# Patient Record
Sex: MALE | Race: WHITE | NOT HISPANIC OR LATINO | Employment: FULL TIME | URBAN - METROPOLITAN AREA
[De-identification: names, ages, dates, MRNs, and addresses within clinical notes are randomized per-mention and may not be internally consistent; named-entity substitution may affect disease eponyms.]

---

## 2019-07-09 ENCOUNTER — OFFICE VISIT - HEALTHEAST (OUTPATIENT)
Dept: FAMILY MEDICINE | Facility: CLINIC | Age: 49
End: 2019-07-09

## 2019-07-09 ENCOUNTER — HOSPITAL ENCOUNTER (OUTPATIENT)
Dept: CT IMAGING | Facility: HOSPITAL | Age: 49
Discharge: HOME | End: 2019-07-09
Attending: FAMILY MEDICINE

## 2019-07-09 ENCOUNTER — RECORDS - HEALTHEAST (OUTPATIENT)
Dept: ADMINISTRATIVE | Facility: OTHER | Age: 49
End: 2019-07-09

## 2019-07-09 DIAGNOSIS — S06.0X0A CONCUSSION WITHOUT LOSS OF CONSCIOUSNESS, INITIAL ENCOUNTER: ICD-10-CM

## 2019-07-09 DIAGNOSIS — S09.90XA CLOSED HEAD INJURY, INITIAL ENCOUNTER: ICD-10-CM

## 2019-07-09 ASSESSMENT — MIFFLIN-ST. JEOR: SCORE: 1802.02

## 2019-07-09 NOTE — ASSESSMENT & PLAN NOTE
Given significant symptomology as well as higher risk due to previous head injuries, CT scan was done.  CT scan is normal.  I discussed the results with patient via phone.  I suspect concussion and again he is significantly symptomatic.  The patient was taken not out of work for the next week as he works in a high pressure job as an .  Continue with over-the-counter medications as needed.  He was encouraged to rest a lot but light physical activity is also encouraged.  We reviewed activities he should avoid.  Follow-up promptly for any worsening of neurologic symptoms.  He is otherwise scheduled for follow-up with 1 of my partners in 1 week as I will be out of the office.  Patient is at high risk of persistent symptoms given his history of head injury as well as significant symptoms.  For this reason a referral was placed to the concussion clinic.

## 2021-05-26 ENCOUNTER — RECORDS - HEALTHEAST (OUTPATIENT)
Dept: ADMINISTRATIVE | Facility: CLINIC | Age: 51
End: 2021-05-26

## 2021-05-29 ENCOUNTER — RECORDS - HEALTHEAST (OUTPATIENT)
Dept: ADMINISTRATIVE | Facility: CLINIC | Age: 51
End: 2021-05-29

## 2021-05-30 ENCOUNTER — RECORDS - HEALTHEAST (OUTPATIENT)
Dept: ADMINISTRATIVE | Facility: CLINIC | Age: 51
End: 2021-05-30

## 2021-05-31 ENCOUNTER — RECORDS - HEALTHEAST (OUTPATIENT)
Dept: ADMINISTRATIVE | Facility: CLINIC | Age: 51
End: 2021-05-31

## 2021-06-03 VITALS — WEIGHT: 195.8 LBS | HEIGHT: 73 IN | BODY MASS INDEX: 25.95 KG/M2

## 2021-06-16 PROBLEM — S06.0X0A CONCUSSION WITHOUT LOSS OF CONSCIOUSNESS, INITIAL ENCOUNTER: Status: ACTIVE | Noted: 2019-07-09

## 2021-06-17 NOTE — PATIENT INSTRUCTIONS - HE
Patient Instructions by Toshia Ontiveros MD at 7/9/2019  1:00 PM     Author: Toshia Ontiveros MD Service: -- Author Type: Physician    Filed: 7/9/2019  1:39 PM Encounter Date: 7/9/2019 Status: Addendum    : Toshia Ontiveros MD (Physician)    Related Notes: Original Note by Toshia Ontiveros MD (Physician) filed at 7/9/2019  1:38 PM       Proceed to hospital for CT scan today as scheduled.  I will speak to you on the phone after your scan.  You will receive a call to schedule with the concussion clinic.  No work for one week. Light physical activity such as walking is recommended. Avoid vigorous physical activity. Avoid TV, video games, reading for the next week. Rest as much as possible.  Follow up as scheduled in [place] in one week.    Patient Education     Discharge Instructions for Concussion  You have been diagnosed with a concussion, a type of brain injury caused by a sudden impact to your head. It can also be caused by sudden movement of your brain inside your head, such as from forceful shaking. Some concussions are mild. Most people recover completely from mild concussions. But recovery may take days, weeks, or months. For some, symptoms may last even longer. Early care and monitoring are important to prevent long-term complications.  Home care  Do's and don'ts:     Ask a friend or family member to stay with you for a few days. You should not be alone until you know how the injury has affected you.    Tell your caregiver to wake you every 2 to 3 hours during the first night. Your caregiver should call 911 if he or she cant wake you, or if you are confused.    Dont take any medicine--not even aspirin--unless your healthcare provider says it's OK. If you have a headache, try placing a cold, damp cloth on your forehead.    Eat light. Clear liquids, such as broth or gelatin, are a good choice.    Don't drink alcohol or use any recreational drugs.     Don't return to sports or any activity that  could cause you to hit your head until all symptoms are gone and you have been cleared by your doctor. A second head injury before full recovery from the first one can lead to serious brain injury.    Avoid activities that require a lot of concentration or attention. This will allow your brain to rest and heal more quickly.  The best way to recover is to discuss symptoms with your healthcare provider and your family. Work closely with your healthcare provider and give your brain time to heal.  Follow-up care  Follow up with your healthcare provider, or as advised.     When to call your healthcare provider  Your caregiver should call 911 right away if you have fallen asleep, cannot be awakened, or you are confused.  Otherwise, call your healthcare provider right away if any of these occur:    Vomiting    Clear or bloody drainage from your nose or ear    Constant drowsiness or trouble waking up    Confusion or memory loss    Blurred vision    Trouble walking, talking, or concentrating    Increased weakness or problems with coordination    Constant headache that cant be relieved or gets worse    Changes in behavior or personality   Date Last Reviewed: 11/5/2015 2000-2017 [employer]. [address] [place] [address], [place] [address]. All rights reserved. This information is not intended as a substitute for professional medical care. Always follow your healthcare professional's instructions.

## 2021-06-19 NOTE — LETTER
Letter by Toshia Ontiveros MD at      Author: Toshia Ontiveros MD Service: -- Author Type: --    Filed:  Encounter Date: 7/9/2019 Status: (Other)         July 9, 2019     Patient: Carlton Rushing   YOB: 1970   Date of Visit: 7/9/2019       To whom it may concern,    Carlton Rushing was treated at our facility today.  Please excuse from work 7/8/2019 through 7/16/2019 for medical reasons.      Physician Name (Printed) - Toshia Ontiveros MD      _________________________________________  Physician Signature
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
## 2023-02-08 ENCOUNTER — APPOINTMENT (OUTPATIENT)
Dept: RADIOLOGY | Facility: HOSPITAL | Age: 53
End: 2023-02-08
Attending: EMERGENCY MEDICINE
Payer: COMMERCIAL

## 2023-02-08 ENCOUNTER — HOSPITAL ENCOUNTER (EMERGENCY)
Facility: HOSPITAL | Age: 53
Discharge: HOME | End: 2023-02-08
Attending: EMERGENCY MEDICINE | Admitting: EMERGENCY MEDICINE
Payer: COMMERCIAL

## 2023-02-08 VITALS
WEIGHT: 200 LBS | BODY MASS INDEX: 26.39 KG/M2 | OXYGEN SATURATION: 98 % | RESPIRATION RATE: 16 BRPM | DIASTOLIC BLOOD PRESSURE: 84 MMHG | HEART RATE: 74 BPM | TEMPERATURE: 98.1 F | SYSTOLIC BLOOD PRESSURE: 134 MMHG

## 2023-02-08 DIAGNOSIS — R07.9 CHEST PAIN, UNSPECIFIED TYPE: ICD-10-CM

## 2023-02-08 LAB
ANION GAP SERPL CALCULATED.3IONS-SCNC: 10 MMOL/L (ref 7–15)
BASOPHILS # BLD AUTO: 0.1 10E3/UL (ref 0–0.2)
BASOPHILS NFR BLD AUTO: 1 %
BUN SERPL-MCNC: 11.9 MG/DL (ref 6–20)
CALCIUM SERPL-MCNC: 9.3 MG/DL (ref 8.6–10)
CHLORIDE SERPL-SCNC: 104 MMOL/L (ref 98–107)
CREAT SERPL-MCNC: 1 MG/DL (ref 0.67–1.17)
D DIMER PPP FEU-MCNC: <0.27 UG/ML FEU (ref 0–0.5)
DEPRECATED HCO3 PLAS-SCNC: 25 MMOL/L (ref 22–29)
EOSINOPHIL # BLD AUTO: 0.3 10E3/UL (ref 0–0.7)
EOSINOPHIL NFR BLD AUTO: 5 %
ERYTHROCYTE [DISTWIDTH] IN BLOOD BY AUTOMATED COUNT: 12.6 % (ref 10–15)
GFR SERPL CREATININE-BSD FRML MDRD: >90 ML/MIN/1.73M2
GLUCOSE SERPL-MCNC: 106 MG/DL (ref 70–99)
HCT VFR BLD AUTO: 43.9 % (ref 40–53)
HGB BLD-MCNC: 14.7 G/DL (ref 13.3–17.7)
IMM GRANULOCYTES # BLD: 0 10E3/UL
IMM GRANULOCYTES NFR BLD: 0 %
LYMPHOCYTES # BLD AUTO: 1.9 10E3/UL (ref 0.8–5.3)
LYMPHOCYTES NFR BLD AUTO: 34 %
MCH RBC QN AUTO: 29.8 PG (ref 26.5–33)
MCHC RBC AUTO-ENTMCNC: 33.5 G/DL (ref 31.5–36.5)
MCV RBC AUTO: 89 FL (ref 78–100)
MONOCYTES # BLD AUTO: 0.4 10E3/UL (ref 0–1.3)
MONOCYTES NFR BLD AUTO: 8 %
NEUTROPHILS # BLD AUTO: 2.9 10E3/UL (ref 1.6–8.3)
NEUTROPHILS NFR BLD AUTO: 52 %
NRBC # BLD AUTO: 0 10E3/UL
NRBC BLD AUTO-RTO: 0 /100
PLATELET # BLD AUTO: 278 10E3/UL (ref 150–450)
POTASSIUM SERPL-SCNC: 4.7 MMOL/L (ref 3.4–5.3)
RBC # BLD AUTO: 4.94 10E6/UL (ref 4.4–5.9)
SODIUM SERPL-SCNC: 139 MMOL/L (ref 136–145)
TROPONIN T SERPL HS-MCNC: 11 NG/L
TROPONIN T SERPL HS-MCNC: 9 NG/L
WBC # BLD AUTO: 5.5 10E3/UL (ref 4–11)

## 2023-02-08 PROCEDURE — 99285 EMERGENCY DEPT VISIT HI MDM: CPT | Mod: 25

## 2023-02-08 PROCEDURE — 82947 ASSAY GLUCOSE BLOOD QUANT: CPT | Performed by: EMERGENCY MEDICINE

## 2023-02-08 PROCEDURE — 85379 FIBRIN DEGRADATION QUANT: CPT | Performed by: EMERGENCY MEDICINE

## 2023-02-08 PROCEDURE — 84484 ASSAY OF TROPONIN QUANT: CPT | Mod: 91 | Performed by: EMERGENCY MEDICINE

## 2023-02-08 PROCEDURE — 84484 ASSAY OF TROPONIN QUANT: CPT | Performed by: EMERGENCY MEDICINE

## 2023-02-08 PROCEDURE — 80051 ELECTROLYTE PANEL: CPT | Performed by: EMERGENCY MEDICINE

## 2023-02-08 PROCEDURE — 250N000013 HC RX MED GY IP 250 OP 250 PS 637: Performed by: EMERGENCY MEDICINE

## 2023-02-08 PROCEDURE — 71046 X-RAY EXAM CHEST 2 VIEWS: CPT

## 2023-02-08 PROCEDURE — 93005 ELECTROCARDIOGRAM TRACING: CPT | Performed by: EMERGENCY MEDICINE

## 2023-02-08 PROCEDURE — 36415 COLL VENOUS BLD VENIPUNCTURE: CPT | Performed by: EMERGENCY MEDICINE

## 2023-02-08 PROCEDURE — 85014 HEMATOCRIT: CPT | Performed by: EMERGENCY MEDICINE

## 2023-02-08 RX ORDER — ASPIRIN 81 MG/1
324 TABLET, CHEWABLE ORAL ONCE
Status: COMPLETED | OUTPATIENT
Start: 2023-02-08 | End: 2023-02-08

## 2023-02-08 RX ADMIN — ASPIRIN 324 MG: 81 TABLET, CHEWABLE ORAL at 09:52

## 2023-02-08 NOTE — ED TRIAGE NOTES
Patient presents here with mid sternal chest pain that began about 40 minutes prior to presentation here. He also notes bilateral jaw pain accompanying his symptoms. Pain is pressure like in nature. He rated the pain upon onset a 7/10. At this time, it is 5/10.

## 2023-02-08 NOTE — DISCHARGE INSTRUCTIONS
You were seen in the emergency department for chest pain.  Your evaluation included EKG and blood tests which were negative for any sign of ongoing heart injury.  Your chest x-ray also looked clear.  You had additional lab tests which did not show any signs of blood clotting.  We think your symptoms still sound concerning and we need you to follow-up as soon as possible with a specialty provider at the rapid access cardiology clinic.  We did place a referral, if you have not heard anything within the next 24 hours please call them to confirm an appointment.  We would recommend avoiding strenuous activities and following up there soon as possible to continue outpatient evaluation.  We would also recommend continuing to use Tylenol 650 mg every 6 hours for pain.  Avoid spicy foods fatty foods and alcohol as much as possible in the event that this is related to esophageal or stomach inflammation.  Give any other immediate concerns like severe breathing difficulties at rest, severe evolution of your chest pain, etc. we can reevaluate you in the emergency department anytime

## 2023-02-08 NOTE — ED PROVIDER NOTES
EMERGENCY DEPARTMENT ENCOUNTER      NAME: Carlton Rushing  AGE: 52 year old male  YOB: 1970  MRN: [id number]  EVALUATION DATE & TIME: No admission date for patient encounter.    PCP: No primary care provider on file.    ED PROVIDER: Chris Acosta M.D.      Chief Complaint   Patient presents with     Chest Pain         FINAL IMPRESSION:  1. Chest pain, unspecified type          ED COURSE & MEDICAL DECISION MAKIN:35 AM Resident met with the patient and gathered an initial history.   1:00 PM Rechecked and updated patient. I discussed plans for discharge with the patient, which they were agreeable to. We discussed supportive cares at home and reasons for return to the ER including new or worsening symptoms. All questions and concerns were addressed. Patient to be discharged by RN.     52 year old male presents to the Emergency Department for evaluation of chest pain.  Patient is vitally stable and well-appearing when he arrives to the emergency department.  Cardiopulmonary exam is unremarkable.  EKG reviewed shows sinus rhythm with no acute ischemic changes.  Troponin is within normal limits for male patient, no significant rise on recheck here and remains within normal limits.  D-dimer is within normal limits ruling out pulmonary embolism which seems unlikely based on history.  Aortic dissection felt unlikely based on history and clinical examination.  Chest x-ray negative for infiltrate, pneumothorax, or mediastinal abnormality.  Patient was resting comfortably on recheck with overall some improvement in his symptoms.  We discussed his reassuring work-up so far.  Given his somewhat concerning history, we did discuss possibility of admitting him to the hospital even in the absence of any other cardiovascular risk factors.  Patient was comfortable however with the plan for discharge home and was referred emergently to the rapid access clinic for follow-up and additional outpatient work-up.  We  did discuss return precautions for any severe evolution of his pain, severe breathing difficulties, lightheadedness, diaphoresis, or other immediate concern.  Patient was discharged in stable condition    At the conclusion of the encounter I discussed the results of all of the tests and the disposition. The questions were answered. The patient or family acknowledged understanding and was agreeable with the care plan.       Medical Decision Making    History:    Supplemental history from: Documented in chart, if applicable    External Record(s) reviewed: Documented in chart, if applicable.    Work Up:    Chart documentation includes differential considered and any EKGs or imaging independently interpreted by provider, where specified.    In additional to work up documented, I considered the following work up: Documented in chart, if applicable.    External consultation:    Discussion of management with another provider: Documented in chart, if applicable    Complicating factors:    Care impacted by chronic illness: N/A    Care affected by social determinants of health: N/A    Disposition considerations: Discharge. No recommendations on prescription strength medication(s). I considered admission, but discharged the patient after share decision making conversation.            MEDICATIONS GIVEN IN THE EMERGENCY:  Medications   aspirin (ASA) chewable tablet 324 mg (324 mg Oral Given 2/8/23 0952)       NEW PRESCRIPTIONS STARTED AT TODAY'S ER VISIT  Discharge Medication List as of 2/8/2023  1:28 PM             =================================================================    HPI    Patient information was obtained from: Patient     Use of : N/A         Carlton Rushing is a 52 year old male with no contributory medical history who presents to this ED via walk in for evaluation of chest pain.     The patient started having left sided chest pain that radiates to his neck, jaw and arm 45 minutes ago. He was  sitting down at his desk at rest when the pain came on suddenly. The pain was a 7/10 and is not worse with exertion. He reported having 2 cups of black coffee this morning prior to the pain. He reports having an episode like this before, but says the pain went away and he was doing nothing at that time as well. He still endorses pain in the ED.     Patient denies any cough, fever, or leg swelling. He's never met with cardiology before. No personal or family history of heart problems. Patient does not smoke or use drugs, but has occasional drinks. Patient does chew tobacco.     REVIEW OF SYSTEMS   All systems reviewed and negative except as noted in HPI.    PAST MEDICAL HISTORY:  History reviewed. No pertinent past medical history.    PAST SURGICAL HISTORY:  Past Surgical History:   Procedure Laterality Date     FOOT NEUROMA SURGERY [employer]           CURRENT MEDICATIONS:    No current facility-administered medications for this encounter.     Current Outpatient Medications   Medication     fexofenadine-pseudoePHEDrine (ALLEGRA-D 24) 180-240 MG per tablet     fexofenadine-pseudoePHEDrine (ALLEGRA-D 24) 180-240 MG per tablet     NO ACTIVE MEDICATIONS     ondansetron (ZOFRAN ODT) 4 MG disintegrating tablet         ALLERGIES:  Allergies   Allergen Reactions     Seasonal Allergies        FAMILY HISTORY:  No family history on file.    SOCIAL HISTORY:   Social History     Socioeconomic History     Marital status:    Tobacco Use     Smoking status: Never     Smokeless tobacco: Former     Tobacco comments:     chew 3 tins a week   Substance and Sexual Activity     Alcohol use: Yes     Alcohol/week: 14.0 standard drinks     Comment: weekly     Drug use: Never     Sexual activity: Yes     Partners: Female     Comment: Wife       VITALS:  /84   Pulse 74   Temp 98.1  F (36.7  C) (Oral)   Resp 16   Wt 90.7 kg (200 lb)   SpO2 98%   BMI 26.39 kg/m      PHYSICAL EXAM    Constitutional: Well developed, Well  nourished, NAD.  HENT: Normocephalic, Atraumatic. Neck Supple.  Eyes: EOMI, Conjunctiva normal.  Respiratory: Breathing comfortably on room air. Speaks full sentences easily. Lungs clear to ascultation.  Cardiovascular: Normal heart rate, Regular rhythm. No peripheral edema.  Abdomen: Soft, nontender  Musculoskeletal: Good range of motion in all major joints. No major deformities noted.  Integument: Warm, Dry.  Neurologic: Alert & awake, Normal motor function, Normal sensory function, No focal deficits noted.   Psychiatric: Cooperative. Affect appropriate.     LAB:  All pertinent labs reviewed and interpreted.  Labs Ordered and Resulted from Time of ED Arrival to Time of ED Departure   BASIC METABOLIC PANEL - Abnormal       Result Value    Sodium 139      Potassium 4.7      Chloride 104      Carbon Dioxide (CO2) 25      Anion Gap 10      Urea Nitrogen 11.9      Creatinine 1.00      Calcium 9.3      Glucose 106 (*)     GFR Estimate >90     TROPONIN T, HIGH SENSITIVITY - Normal    Troponin T, High Sensitivity 9     D DIMER QUANTITATIVE - Normal    D-Dimer Quantitative <0.27     TROPONIN T, HIGH SENSITIVITY - Normal    Troponin T, High Sensitivity 11     CBC WITH PLATELETS AND DIFFERENTIAL    WBC Count 5.5      RBC Count 4.94      Hemoglobin 14.7      Hematocrit 43.9      MCV 89      MCH 29.8      MCHC 33.5      RDW 12.6      Platelet Count 278      % Neutrophils 52      % Lymphocytes 34      % Monocytes 8      % Eosinophils 5      % Basophils 1      % Immature Granulocytes 0      NRBCs per 100 WBC 0      Absolute Neutrophils 2.9      Absolute Lymphocytes 1.9      Absolute Monocytes 0.4      Absolute Eosinophils 0.3      Absolute Basophils 0.1      Absolute Immature Granulocytes 0.0      Absolute NRBCs 0.0         RADIOLOGY:  Reviewed all pertinent imaging. Please see official radiology report.  Chest XR,  PA & LAT   Final Result   IMPRESSION: Negative chest.          EKG:    Performed at: 2/8/2023 9:22     Impression:  Normal Sinus Rhythm, Normal ECG, no change from 2006.     Rate: 2/8/2023 9:22   Rhythm: Normal sinus rhythm   Axis: -15   AL Interval: 116 ms   QRS Interval: 108 ms   QTc Interval: 441 ms   ST Changes: None  Comparison: 11/24/2006     I have independently reviewed and interpreted the EKG(s) documented above.        I, Tonny Mejia, am serving as a scribe to document services personally performed by Dr. Chris Acosta, based on my observation and the provider's statements to me. I, Chris Acosta MD attest that Tonny Mejia is acting in a scribe capacity, has observed my performance of the services and has documented them in accordance with my direction.    Chris Acosta M.D.  Emergency Medicine  [facility] EMERGENCY DEPARTMENT  [facility] [facility] [address]  [phone]  Dept: [phone]     Chris Acosta MD  02/08/23 1427

## 2023-02-10 ENCOUNTER — OFFICE VISIT (OUTPATIENT)
Dept: CARDIOLOGY | Facility: CLINIC | Age: 53
End: 2023-02-10
Payer: COMMERCIAL

## 2023-02-10 VITALS
WEIGHT: 206.1 LBS | HEART RATE: 67 BPM | OXYGEN SATURATION: 97 % | BODY MASS INDEX: 26.45 KG/M2 | SYSTOLIC BLOOD PRESSURE: 130 MMHG | RESPIRATION RATE: 16 BRPM | HEIGHT: 74 IN | DIASTOLIC BLOOD PRESSURE: 78 MMHG

## 2023-02-10 DIAGNOSIS — R07.9 CHEST PAIN, UNSPECIFIED TYPE: ICD-10-CM

## 2023-02-10 LAB
ATRIAL RATE - MUSE: 57 BPM
C REACTIVE PROTEIN LHE: <0.1 MG/DL (ref 0–?)
DIASTOLIC BLOOD PRESSURE - MUSE: NORMAL MMHG
ERYTHROCYTE [SEDIMENTATION RATE] IN BLOOD BY WESTERGREN METHOD: 5 MM/HR (ref 0–15)
INTERPRETATION ECG - MUSE: NORMAL
P AXIS - MUSE: 33 DEGREES
PR INTERVAL - MUSE: 122 MS
QRS DURATION - MUSE: 108 MS
QT - MUSE: 422 MS
QTC - MUSE: 410 MS
R AXIS - MUSE: -18 DEGREES
SYSTOLIC BLOOD PRESSURE - MUSE: NORMAL MMHG
T AXIS - MUSE: 23 DEGREES
TROPONIN I SERPL-MCNC: 0.01 NG/ML (ref 0–0.29)
VENTRICULAR RATE- MUSE: 57 BPM

## 2023-02-10 PROCEDURE — 86140 C-REACTIVE PROTEIN: CPT | Performed by: INTERNAL MEDICINE

## 2023-02-10 PROCEDURE — 36415 COLL VENOUS BLD VENIPUNCTURE: CPT | Performed by: INTERNAL MEDICINE

## 2023-02-10 PROCEDURE — 99244 OFF/OP CNSLTJ NEW/EST MOD 40: CPT | Performed by: INTERNAL MEDICINE

## 2023-02-10 PROCEDURE — 85652 RBC SED RATE AUTOMATED: CPT | Performed by: INTERNAL MEDICINE

## 2023-02-10 PROCEDURE — 84484 ASSAY OF TROPONIN QUANT: CPT | Performed by: INTERNAL MEDICINE

## 2023-02-10 PROCEDURE — 93000 ELECTROCARDIOGRAM COMPLETE: CPT | Performed by: GENERAL ACUTE CARE HOSPITAL

## 2023-02-10 NOTE — LETTER
2/10/2023    Physician No Ref-Primary  No address on file    RE: Carlton Rushing       Dear Colleague,     I had the pleasure of seeing Carlton Rushing in the [facility].      [facility]  [phone]          Assessment/Recommendations   Patient with onset of chest discomfort which is described as a tightness pushing out as well as some jaw discomfort which prompted evaluation at the emergency department recently.  His EKG, troponins were normal and his chest x-ray was unremarkable.  The jaw discomfort went away in the emergency room but the chest discomfort is remained in a mild but persistent form.  He has it now and has not gone away since that time.  He does not have much in the way of risk factors for coronary artery disease with the exception of an LDL cholesterol of 154 in October of this year.  The discomfort is atypical in that it has been completely persistent.    We will check an EKG today and a troponin as well.    Repeat EKG is normal with no ST-T wave changes.  Sinus bradycardia.    We will check a troponin some inflammatory studies today.  If his troponin comes back negative we will call him with more urgent evaluation.  If the troponin is unremarkable, we will plan on a stress echocardiogram early next week.    He can try to aspirin 3 times a day with meals to see if this would help the discomfort.  It does not sound like esophageal reflux and has not had that issue in the past.           History of Present Illness/Subjective    Mr. Carlton Rushing is a 52 year old male with episodes of chest tightness that he says he has had intermittently over the years but had a particular bad episode recently which led to an evaluation at the emergency department.  He noticed a feeling of pressure in his chest or a tightness and it was as if something was pushing out.  He felt maybe a little bit shortness of breath but then developed some jaw stiffness on both sides of  "his jaw.  This did not go away with some Advil or Tylenol and after talking with the paramedic in his office it was decided he would go to the emergency department.  He was evaluated in the emergency department and his jaw discomfort went away while he was in the emergency department.  His EKG showed sinus rhythm at 76 bpm and is normal and I personally reviewed it.  His troponin was normal as well.  D-dimer was normal.  Blood work was unremarkable.    He is continued to have the mild discomfort in his chest like a pressure.  He feels like again feel his heart beating more and he wonders if there could be some anxiety as he does have a history of some anxiety but he just does not feel like he has been anxious lately and there has not been any additional stress.  He tried working a bit in the yard and raking some snow off of the rough and it did not seem to make anything worse but the chest discomfort did not get better either.    He denies orthopnea, paroxysmal nocturnal dyspnea, peripheral edema, syncope or near syncopal episodes.  He has no history rheumatic fever, heart murmur, cerebrovascular accident or TIA.    He is never smoked cigarettes, has not been treated for hypertension, has an LDL cholesterol of 154 and he is not diabetic.  He does not have a family history of premature coronary artery disease.  His brother had Valerie-Parkinson-White syndrome and an ablation and he has a child who had SVT and ablation when they were quite young.    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  He is  and his spouse is with him at the time of our evaluation.  He has 2 children.  Patient grew up in [facility].    ECG: Personally reviewed.       Physical Examination Review of Systems   /78 (BP Location: Right arm, Patient Position: Sitting, Cuff Size: Adult Regular)   Pulse 67   Resp 16   Ht 1.867 m (6' 1.5\")   Wt 93.5 kg (206 lb 1.6 oz)   SpO2 97% " "  BMI 26.82 kg/m    Body mass index is 26.82 kg/m .  Wt Readings from Last 3 Encounters:   02/10/23 93.5 kg (206 lb 1.6 oz)   07/09/19 88.8 kg (195 lb 12.8 oz)     General Appearance:   Alert, cooperative and in no acute distress.   ENT/Mouth: Patient wearing a mask.      EYES:  no scleral icterus, normal conjunctivae       Chest/Lungs:   Lungs are clear to auscultation, equal chest wall expansion.   Cardiovascular:   S1, S2 without murmur ,clicks or rubs. Brachial, radial and posterior tibial pulses are intact and symetric. No carotid bruits noted   Abdomen:  Nontender. BS+. No bruits.   Extremities: No cyanosis, clubbing or edema   Skin: no xanthelasma, warm.    Neurologic: normal arm movement bilateral, no tremors     Psychiatric: Appropriate affect.      Enc Vitals  BP: 130/78  Pulse: 67  Resp: 16  SpO2: 97 %  Weight: 93.5 kg (206 lb 1.6 oz) (with shoes on)  Height: 186.7 cm (6' 1.5\")                                           Medical History  Surgical History Family History Social History   No past medical history on file. Past Surgical History:   Procedure Laterality Date     FOOT NEUROMA SURGERY Left college    No family history on file. Social History     Socioeconomic History     Marital status:      Spouse name: Not on file     Number of children: Not on file     Years of education: Not on file     Highest education level: Not on file   Occupational History     Not on file   Tobacco Use     Smoking status: Never     Smokeless tobacco: Former     Tobacco comments:     chew 3 tins a week   Substance and Sexual Activity     Alcohol use: Yes     Alcohol/week: 14.0 standard drinks     Comment: weekly     Drug use: Never     Sexual activity: Yes     Partners: Female     Comment: Wife   Other Topics Concern     Not on file   Social History Narrative     Not on file     Social Determinants of Health     Financial Resource Strain: Not on file   Food Insecurity: Not on file   Transportation Needs: Not on file "   Physical Activity: Not on file   Stress: Not on file   Social Connections: Not on file   Intimate Partner Violence: Not on file   Housing Stability: Not on file          Medications  Allergies   Current Outpatient Medications   Medication Sig Dispense Refill     fexofenadine-pseudoePHEDrine (ALLEGRA-D 24) 180-240 MG per tablet Take 1 tablet by mouth daily 30 tablet 1     NO ACTIVE MEDICATIONS  (Patient not taking: Reported on 2/10/2023)       ondansetron (ZOFRAN ODT) 4 MG disintegrating tablet Take 1 tablet by mouth every 6 hours as needed for nausea. (Patient not taking: Reported on 2/10/2023) 10 tablet 0    Allergies   Allergen Reactions     Seasonal Allergies          Lab Results    Chemistry/lipid CBC Cardiac Enzymes/BNP/TSH/INR   Lab Results   Component Value Date    BUN 11.9 02/08/2023     02/08/2023    CO2 25 02/08/2023    Lab Results   Component Value Date    WBC 5.5 02/08/2023    HGB 14.7 02/08/2023    HCT 43.9 02/08/2023    MCV 89 02/08/2023     02/08/2023    No results found for: CKTOTAL, CKMB, TROPONINI, BNP, TSH, INR             Thank you for allowing me to participate in the care of your patient.      Sincerely,     Hoang Cochran MD     [facility]  cc:   Chris Acosta MD  [facility] [address] [place] [id number]

## 2023-02-10 NOTE — PROGRESS NOTES
[facility]  [phone]          Assessment/Recommendations   Patient with onset of chest discomfort which is described as a tightness pushing out as well as some jaw discomfort which prompted evaluation at the emergency department recently.  His EKG, troponins were normal and his chest x-ray was unremarkable.  The jaw discomfort went away in the emergency room but the chest discomfort is remained in a mild but persistent form.  He has it now and has not gone away since that time.  He does not have much in the way of risk factors for coronary artery disease with the exception of an LDL cholesterol of 154 in October of this year.  The discomfort is atypical in that it has been completely persistent.    We will check an EKG today and a troponin as well.    Repeat EKG is normal with no ST-T wave changes.  Sinus bradycardia.    We will check a troponin some inflammatory studies today.  If his troponin comes back negative we will call him with more urgent evaluation.  If the troponin is unremarkable, we will plan on a stress echocardiogram early next week.    He can try to aspirin 3 times a day with meals to see if this would help the discomfort.  It does not sound like esophageal reflux and has not had that issue in the past.           History of Present Illness/Subjective    Mr. Carlton Rushing is a 52 year old male with episodes of chest tightness that he says he has had intermittently over the years but had a particular bad episode recently which led to an evaluation at the emergency department.  He noticed a feeling of pressure in his chest or a tightness and it was as if something was pushing out.  He felt maybe a little bit shortness of breath but then developed some jaw stiffness on both sides of his jaw.  This did not go away with some Advil or Tylenol and after talking with the paramedic in his office it was decided he would go to the emergency department.  He was evaluated in the  "emergency department and his jaw discomfort went away while he was in the emergency department.  His EKG showed sinus rhythm at 76 bpm and is normal and I personally reviewed it.  His troponin was normal as well.  D-dimer was normal.  Blood work was unremarkable.    He is continued to have the mild discomfort in his chest like a pressure.  He feels like again feel his heart beating more and he wonders if there could be some anxiety as he does have a history of some anxiety but he just does not feel like he has been anxious lately and there has not been any additional stress.  He tried working a bit in the yard and raking some snow off of the rough and it did not seem to make anything worse but the chest discomfort did not get better either.    He denies orthopnea, paroxysmal nocturnal dyspnea, peripheral edema, syncope or near syncopal episodes.  He has no history rheumatic fever, heart murmur, cerebrovascular accident or TIA.    He is never smoked cigarettes, has not been treated for hypertension, has an LDL cholesterol of 154 and he is not diabetic.  He does not have a family history of premature coronary artery disease.  His brother had Valerie-Parkinson-White syndrome and an ablation and he has a child who had SVT and ablation when they were quite young.    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  He is  and his spouse is with him at the time of our evaluation.  He has 2 children.  Patient grew up in [place].    ECG: Personally reviewed.       Physical Examination Review of Systems   /78 (BP Location: Right arm, Patient Position: Sitting, Cuff Size: Adult Regular)   Pulse 67   Resp 16   Ht 1.867 m (6' 1.5\")   Wt 93.5 kg (206 lb 1.6 oz)   SpO2 97%   BMI 26.82 kg/m    Body mass index is 26.82 kg/m .  Wt Readings from Last 3 Encounters:   02/10/23 93.5 kg (206 lb 1.6 oz)   07/09/19 88.8 kg (195 lb 12.8 oz)     General Appearance:   " "Alert, cooperative and in no acute distress.   ENT/Mouth: Patient wearing a mask.      EYES:  no scleral icterus, normal conjunctivae       Chest/Lungs:   Lungs are clear to auscultation, equal chest wall expansion.   Cardiovascular:   S1, S2 without murmur ,clicks or rubs. Brachial, radial and posterior tibial pulses are intact and symetric. No carotid bruits noted   Abdomen:  Nontender. BS+. No bruits.   Extremities: No cyanosis, clubbing or edema   Skin: no xanthelasma, warm.    Neurologic: normal arm movement bilateral, no tremors     Psychiatric: Appropriate affect.      Enc Vitals  BP: 130/78  Pulse: 67  Resp: 16  SpO2: 97 %  Weight: 93.5 kg (206 lb 1.6 oz) (with shoes on)  Height: 186.7 cm (6' 1.5\")                                           Medical History  Surgical History Family History Social History   No past medical history on file. Past Surgical History:   Procedure Laterality Date     FOOT NEUROMA SURGERY Left college    No family history on file. Social History     Socioeconomic History     Marital status:      Spouse name: Not on file     Number of children: Not on file     Years of education: Not on file     Highest education level: Not on file   Occupational History     Not on file   Tobacco Use     Smoking status: Never     Smokeless tobacco: Former     Tobacco comments:     chew 3 tins a week   Substance and Sexual Activity     Alcohol use: Yes     Alcohol/week: 14.0 standard drinks     Comment: weekly     Drug use: Never     Sexual activity: Yes     Partners: Female     Comment: Wife   Other Topics Concern     Not on file   Social History Narrative     Not on file     Social Determinants of Health     Financial Resource Strain: Not on file   Food Insecurity: Not on file   Transportation Needs: Not on file   Physical Activity: Not on file   Stress: Not on file   Social Connections: Not on file   Intimate Partner Violence: Not on file   Housing Stability: Not on file          Medications  " Allergies   Current Outpatient Medications   Medication Sig Dispense Refill     fexofenadine-pseudoePHEDrine (ALLEGRA-D 24) 180-240 MG per tablet Take 1 tablet by mouth daily 30 tablet 1     NO ACTIVE MEDICATIONS  (Patient not taking: Reported on 2/10/2023)       ondansetron (ZOFRAN ODT) 4 MG disintegrating tablet Take 1 tablet by mouth every 6 hours as needed for nausea. (Patient not taking: Reported on 2/10/2023) 10 tablet 0    Allergies   Allergen Reactions     Seasonal Allergies          Lab Results    Chemistry/lipid CBC Cardiac Enzymes/BNP/TSH/INR   Lab Results   Component Value Date    BUN 11.9 02/08/2023     02/08/2023    CO2 25 02/08/2023    Lab Results   Component Value Date    WBC 5.5 02/08/2023    HGB 14.7 02/08/2023    HCT 43.9 02/08/2023    MCV 89 02/08/2023     02/08/2023    No results found for: CKTOTAL, CKMB, TROPONINI, BNP, TSH, INR

## 2023-02-13 ENCOUNTER — HOSPITAL ENCOUNTER (OUTPATIENT)
Dept: CARDIOLOGY | Facility: HOSPITAL | Age: 53
Discharge: HOME | End: 2023-02-13
Attending: INTERNAL MEDICINE | Admitting: INTERNAL MEDICINE
Payer: COMMERCIAL

## 2023-02-13 DIAGNOSIS — R07.9 CHEST PAIN, UNSPECIFIED TYPE: ICD-10-CM

## 2023-02-13 PROCEDURE — 255N000002 HC RX 255 OP 636: Performed by: INTERNAL MEDICINE

## 2023-02-13 PROCEDURE — C8928 TTE W OR W/O FOL W/CON,STRES: HCPCS

## 2023-02-13 PROCEDURE — 93325 DOPPLER ECHO COLOR FLOW MAPG: CPT | Mod: 26 | Performed by: INTERNAL MEDICINE

## 2023-02-13 PROCEDURE — 93017 CV STRESS TEST TRACING ONLY: CPT

## 2023-02-13 PROCEDURE — 93350 STRESS TTE ONLY: CPT | Mod: 26 | Performed by: INTERNAL MEDICINE

## 2023-02-13 PROCEDURE — 93352 ADMIN ECG CONTRAST AGENT: CPT | Performed by: INTERNAL MEDICINE

## 2023-02-13 PROCEDURE — 93321 DOPPLER ECHO F-UP/LMTD STD: CPT | Mod: 26 | Performed by: INTERNAL MEDICINE

## 2023-02-13 PROCEDURE — 93016 CV STRESS TEST SUPVJ ONLY: CPT | Performed by: INTERNAL MEDICINE

## 2023-02-13 PROCEDURE — 93018 CV STRESS TEST I&R ONLY: CPT | Performed by: INTERNAL MEDICINE

## 2023-02-13 RX ADMIN — PERFLUTREN 3 ML: 6.52 INJECTION, SUSPENSION INTRAVENOUS at 08:20

## 2023-05-13 ENCOUNTER — HEALTH MAINTENANCE LETTER (OUTPATIENT)
Age: 53
End: 2023-05-13

## 2024-07-20 ENCOUNTER — HEALTH MAINTENANCE LETTER (OUTPATIENT)
Age: 54
End: 2024-07-20

## 2025-08-09 ENCOUNTER — HEALTH MAINTENANCE LETTER (OUTPATIENT)
Age: 55
End: 2025-08-09